# Patient Record
Sex: FEMALE | Race: BLACK OR AFRICAN AMERICAN | Employment: UNEMPLOYED | URBAN - METROPOLITAN AREA
[De-identification: names, ages, dates, MRNs, and addresses within clinical notes are randomized per-mention and may not be internally consistent; named-entity substitution may affect disease eponyms.]

---

## 2024-04-25 ENCOUNTER — OFFICE VISIT (OUTPATIENT)
Age: 74
End: 2024-04-25
Payer: MEDICARE

## 2024-04-25 VITALS
OXYGEN SATURATION: 99 % | SYSTOLIC BLOOD PRESSURE: 135 MMHG | HEART RATE: 80 BPM | RESPIRATION RATE: 16 BRPM | TEMPERATURE: 97.6 F | BODY MASS INDEX: 21.9 KG/M2 | WEIGHT: 153 LBS | HEIGHT: 70 IN | DIASTOLIC BLOOD PRESSURE: 73 MMHG

## 2024-04-25 DIAGNOSIS — R01.1 SYSTOLIC MURMUR: ICD-10-CM

## 2024-04-25 DIAGNOSIS — I20.89 ATYPICAL ANGINA (HCC): ICD-10-CM

## 2024-04-25 DIAGNOSIS — R06.02 EXERTIONAL SHORTNESS OF BREATH: ICD-10-CM

## 2024-04-25 DIAGNOSIS — I51.89 DIASTOLIC DYSFUNCTION: ICD-10-CM

## 2024-04-25 DIAGNOSIS — R09.89 BRUIT OF RIGHT CAROTID ARTERY: ICD-10-CM

## 2024-04-25 DIAGNOSIS — R94.31 ABNORMAL ECG: ICD-10-CM

## 2024-04-25 DIAGNOSIS — I47.19 OTHER SUPRAVENTRICULAR TACHYCARDIA (HCC): ICD-10-CM

## 2024-04-25 DIAGNOSIS — I10 PRIMARY HYPERTENSION: ICD-10-CM

## 2024-04-25 DIAGNOSIS — R00.2 PALPITATIONS: Primary | ICD-10-CM

## 2024-04-25 PROCEDURE — 1090F PRES/ABSN URINE INCON ASSESS: CPT | Performed by: INTERNAL MEDICINE

## 2024-04-25 PROCEDURE — G8420 CALC BMI NORM PARAMETERS: HCPCS | Performed by: INTERNAL MEDICINE

## 2024-04-25 PROCEDURE — G8400 PT W/DXA NO RESULTS DOC: HCPCS | Performed by: INTERNAL MEDICINE

## 2024-04-25 PROCEDURE — 1123F ACP DISCUSS/DSCN MKR DOCD: CPT | Performed by: INTERNAL MEDICINE

## 2024-04-25 PROCEDURE — G8427 DOCREV CUR MEDS BY ELIG CLIN: HCPCS | Performed by: INTERNAL MEDICINE

## 2024-04-25 PROCEDURE — 3017F COLORECTAL CA SCREEN DOC REV: CPT | Performed by: INTERNAL MEDICINE

## 2024-04-25 PROCEDURE — 99214 OFFICE O/P EST MOD 30 MIN: CPT | Performed by: INTERNAL MEDICINE

## 2024-04-25 PROCEDURE — 3075F SYST BP GE 130 - 139MM HG: CPT | Performed by: INTERNAL MEDICINE

## 2024-04-25 PROCEDURE — 3078F DIAST BP <80 MM HG: CPT | Performed by: INTERNAL MEDICINE

## 2024-04-25 PROCEDURE — 93000 ELECTROCARDIOGRAM COMPLETE: CPT | Performed by: INTERNAL MEDICINE

## 2024-04-25 PROCEDURE — 1036F TOBACCO NON-USER: CPT | Performed by: INTERNAL MEDICINE

## 2024-04-25 RX ORDER — ACETAMINOPHEN 500 MG
500 TABLET ORAL EVERY 4 HOURS PRN
COMMUNITY

## 2024-04-25 RX ORDER — FLECAINIDE ACETATE 100 MG/1
100 TABLET ORAL 2 TIMES DAILY
COMMUNITY

## 2024-04-25 RX ORDER — ALBUTEROL SULFATE 90 UG/1
2 AEROSOL, METERED RESPIRATORY (INHALATION) EVERY 6 HOURS PRN
COMMUNITY
Start: 2022-09-19

## 2024-04-25 RX ORDER — ERGOCALCIFEROL 1.25 MG/1
50000 CAPSULE ORAL WEEKLY
COMMUNITY
Start: 2024-03-03

## 2024-04-25 RX ORDER — BIMATOPROST 0.1 MG/ML
1 SOLUTION/ DROPS OPHTHALMIC NIGHTLY
COMMUNITY
Start: 2022-01-06

## 2024-04-25 RX ORDER — PAROXETINE 10 MG/1
10 TABLET, FILM COATED ORAL EVERY MORNING
COMMUNITY
Start: 2023-12-13

## 2024-04-25 RX ORDER — AMLODIPINE BESYLATE 10 MG/1
10 TABLET ORAL DAILY
COMMUNITY
Start: 2022-12-22

## 2024-04-25 RX ORDER — METFORMIN HYDROCHLORIDE 500 MG/1
500 TABLET, EXTENDED RELEASE ORAL
COMMUNITY
Start: 2023-02-06

## 2024-04-25 ASSESSMENT — ENCOUNTER SYMPTOMS
EYES NEGATIVE: 1
GASTROINTESTINAL NEGATIVE: 1
SHORTNESS OF BREATH: 0
ALLERGIC/IMMUNOLOGIC NEGATIVE: 1

## 2024-04-25 NOTE — PROGRESS NOTES
1. \"Have you been to the ER, urgent care clinic since your last visit?  Hospitalized since your last visit?\" Reviewed by Dr. Andrea Herring    2. \"Have you seen or consulted any other health care providers outside of the [facility] since your last visit?\" Reviewed by Dr. Andrea Herring

## 2024-04-25 NOTE — PROGRESS NOTES
Ashanti Sigala (:  1950) is a 74 y.o. female,Established patient, here for evaluation of the following chief complaint(s):  Follow-up    Subjective   SUBJECTIVE/OBJECTIVE:  HPI  Patient presents today for follow-up. She has a history of atrial tachycardia and was placed on flecainide at low dosages, this has since controlled her arrhythmia.  She may experience periodic palpitations but minimal.  No chest pain. No excess shortness of breath. She is somewhat concerned because of some abdominal discomfort she has had in weight loss and is undergoing a GI evaluation. No overt problems at this time. No orthopnea. She does have hypertension and type 2 diabetes.           I have reviewed all available medical records, prior office notes, lab, x-ray and procedure reports.    Past Medical History:   Diagnosis Date    Abdominal pain     Anxiety     COPD (chronic obstructive pulmonary disease) (HCC)     QUIT     Kidney stones     Lung cancer (HCC) 2010    Osteoarthritis         Past Surgical History:   Procedure Laterality Date    COLONOSCOPY N/A 2023    SCREENING COLONOSCOPY performed by Edward Cooper MD at [facility] ENDOSCOPY    HYSTERECTOMY (CERVIX STATUS UNKNOWN)      THORACOTOMY  2010    TOTAL HIP ARTHROPLASTY  2010    UROLOGICAL SURGERY Right 05/10/2017    Right percutaneous nephrolithotomy - Dr. Gabriel    UROLOGICAL SURGERY N/A 2017    NEPHROLITHOTOMY PERCUTANEOUS - Dr. Gabriel        Allergies   Allergen Reactions    Latex Hives    Hydromorphone      Other Reaction(s): rash/itching    Guaifenesin      Other Reaction(s): Unknown    2018 sb    Penicillins Other (See Comments)     Other Reaction(s): gi distress, Unknown    GI DISTRESS    Problems w/ nerves    *nausea,hives* 02 [facility] [id number]    Ranitidine      Other Reaction(s): Unknown    Ranitidine Bismuth Citrate      Other Reaction(s): Unknown    Tetracycline      Other Reaction(s): Unknown    Codeine Itching and Rash     Other

## 2024-06-05 RX ORDER — FLECAINIDE ACETATE 100 MG/1
100 TABLET ORAL 2 TIMES DAILY
Qty: 180 TABLET | Refills: 3 | Status: SHIPPED | OUTPATIENT
Start: 2024-06-05 | End: 2024-06-06

## 2024-06-05 RX ORDER — FLECAINIDE ACETATE 100 MG/1
100 TABLET ORAL 2 TIMES DAILY
Qty: 60 TABLET | Refills: 0 | Status: SHIPPED | OUTPATIENT
Start: 2024-06-05 | End: 2024-06-05 | Stop reason: SDUPTHER

## 2024-06-05 NOTE — TELEPHONE ENCOUNTER
Needs supply to local pharmacy and delivery pharmacy as she is out and it may take some time for delivery to get to her.     Prescriptions sent for flecainide    Adilene Hicks PA-C  4:23 PM

## 2024-06-05 NOTE — TELEPHONE ENCOUNTER
PCP: Kali Escobar MD    Last appt:  4/25/2024   Future Appointments   Date Time Provider Department Center   11/4/2024  9:30 AM [facility]   11/19/2024  1:30 PM Andrea Herring Sr., MD [facility]       Requested Prescriptions     Pending Prescriptions Disp Refills    flecainide (TAMBOCOR) 100 MG tablet 60 tablet 2     Sig: Take 1 tablet by mouth 2 times daily       Request for a 30 or 90 day supply? Provider Discretion    Pharmacy: [facility]    Other Comments:

## 2024-06-06 RX ORDER — FLECAINIDE ACETATE 100 MG/1
100 TABLET ORAL 2 TIMES DAILY
Qty: 180 TABLET | Refills: 3 | Status: SHIPPED | OUTPATIENT
Start: 2024-06-06

## 2024-10-22 DIAGNOSIS — R06.02 SHORTNESS OF BREATH: ICD-10-CM

## 2024-10-22 DIAGNOSIS — R00.2 PALPITATIONS: Primary | ICD-10-CM

## 2024-11-19 ENCOUNTER — OFFICE VISIT (OUTPATIENT)
Age: 74
End: 2024-11-19

## 2024-11-19 VITALS
DIASTOLIC BLOOD PRESSURE: 67 MMHG | BODY MASS INDEX: 22.94 KG/M2 | HEART RATE: 61 BPM | WEIGHT: 160.2 LBS | SYSTOLIC BLOOD PRESSURE: 124 MMHG | HEIGHT: 70 IN | OXYGEN SATURATION: 100 % | TEMPERATURE: 97.2 F

## 2024-11-19 DIAGNOSIS — R06.02 EXERTIONAL SHORTNESS OF BREATH: Primary | ICD-10-CM

## 2024-11-19 DIAGNOSIS — R09.89 BRUIT OF RIGHT CAROTID ARTERY: ICD-10-CM

## 2024-11-19 DIAGNOSIS — I51.89 DIASTOLIC DYSFUNCTION: ICD-10-CM

## 2024-11-19 DIAGNOSIS — R94.31 ABNORMAL ECG: ICD-10-CM

## 2024-11-19 DIAGNOSIS — R00.2 PALPITATIONS: ICD-10-CM

## 2024-11-19 DIAGNOSIS — R01.1 SYSTOLIC MURMUR: ICD-10-CM

## 2024-11-19 DIAGNOSIS — I47.19 OTHER SUPRAVENTRICULAR TACHYCARDIA (HCC): ICD-10-CM

## 2024-11-19 DIAGNOSIS — I20.89 ATYPICAL ANGINA (HCC): ICD-10-CM

## 2024-11-19 DIAGNOSIS — I10 PRIMARY HYPERTENSION: ICD-10-CM

## 2024-11-19 RX ORDER — PAROXETINE 10 MG/1
TABLET, FILM COATED ORAL
COMMUNITY
End: 2024-11-19

## 2024-11-19 ASSESSMENT — PATIENT HEALTH QUESTIONNAIRE - PHQ9
2. FEELING DOWN, DEPRESSED OR HOPELESS: NOT AT ALL
SUM OF ALL RESPONSES TO PHQ QUESTIONS 1-9: 0
1. LITTLE INTEREST OR PLEASURE IN DOING THINGS: NOT AT ALL
SUM OF ALL RESPONSES TO PHQ9 QUESTIONS 1 & 2: 0

## 2024-11-19 ASSESSMENT — ENCOUNTER SYMPTOMS
GASTROINTESTINAL NEGATIVE: 1
EYES NEGATIVE: 1
ALLERGIC/IMMUNOLOGIC NEGATIVE: 1
SHORTNESS OF BREATH: 1

## 2024-11-19 NOTE — PROGRESS NOTES
1. \"Have you been to the ER, urgent care clinic since your last visit?  Hospitalized since your last visit?\" Reviewed by Dr. Andrea Herring    2. \"Have you seen or consulted any other health care providers outside of the [facility] since your last visit?\" Reviewed by Dr. Andrea Herring

## 2024-11-19 NOTE — PROGRESS NOTES
Ashanti Sigala (:  1950) is a 74 y.o. female,Established patient, here for evaluation of the following chief complaint(s):  Follow-up (7month)    Subjective   SUBJECTIVE/OBJECTIVE:  History of Present Illness  The patient presents for evaluation of heart palpitations. She has a history of atrial tachycardia and was placed on flecainide at low dosages, this has since controlled her arrhythmia. She does have hypertension and type 2 diabetes.    She has been experiencing frequent heart palpitations, which she is unsure if they are stress-related or occur randomly, such as during meals. These episodes are not daily, with approximately three instances occurring last month, each lasting a few seconds. She is currently on a regimen of flecainide, taken twice daily. Her breathing is generally normal, but occasionally she needs to slow down and take deeper breaths, rating this discomfort at a 2 or 3 on a scale of 10.    I have carefully reviewed all available medical records, previous office notes, lab, x-ray and procedure reports    Past Medical History:   Diagnosis Date    Abdominal pain     Anxiety     COPD (chronic obstructive pulmonary disease) (HCC)     QUIT     Kidney stones     Lung cancer (HCC) 2010    Osteoarthritis         Past Surgical History:   Procedure Laterality Date    COLONOSCOPY N/A 2023    SCREENING COLONOSCOPY performed by Edward Cooper MD at [facility] ENDOSCOPY    HYSTERECTOMY (CERVIX STATUS UNKNOWN)      THORACOTOMY  2010    TOTAL HIP ARTHROPLASTY  2010    UROLOGICAL SURGERY Right 05/10/2017    Right percutaneous nephrolithotomy - Dr. Gabriel    UROLOGICAL SURGERY N/A 2017    NEPHROLITHOTOMY PERCUTANEOUS - Dr. Gabriel        Allergies   Allergen Reactions    Latex Hives     Other Reaction(s): red, raised itchy rash     Codeine Itching and Rash     Other Reaction(s): rash/itching, Unknown    Other Reaction(s): red, raised itchy rash     Penicillin G      Other Reaction(s):